# Patient Record
Sex: MALE | Race: WHITE | NOT HISPANIC OR LATINO | Employment: FULL TIME | URBAN - NONMETROPOLITAN AREA
[De-identification: names, ages, dates, MRNs, and addresses within clinical notes are randomized per-mention and may not be internally consistent; named-entity substitution may affect disease eponyms.]

---

## 2017-03-06 ENCOUNTER — OFFICE VISIT (OUTPATIENT)
Dept: FAMILY MEDICINE CLINIC | Facility: CLINIC | Age: 32
End: 2017-03-06

## 2017-03-06 VITALS
WEIGHT: 188.9 LBS | DIASTOLIC BLOOD PRESSURE: 78 MMHG | HEIGHT: 70 IN | BODY MASS INDEX: 27.04 KG/M2 | SYSTOLIC BLOOD PRESSURE: 110 MMHG

## 2017-03-06 DIAGNOSIS — Z72.0 TOBACCO ABUSE: Primary | ICD-10-CM

## 2017-03-06 PROCEDURE — 99213 OFFICE O/P EST LOW 20 MIN: CPT | Performed by: FAMILY MEDICINE

## 2017-03-06 RX ORDER — BUPROPION HYDROCHLORIDE 150 MG/1
150 TABLET ORAL DAILY
Qty: 60 TABLET | Refills: 2 | Status: SHIPPED | OUTPATIENT
Start: 2017-03-06 | End: 2017-08-25

## 2017-03-06 NOTE — PROGRESS NOTES
Subjective   Masood Hernández is a 31 y.o. male.     Nicotine Dependence   Presents for initial visit. Symptoms include cravings and decreased concentration. Symptoms are negative for fatigue, headache, insomnia, irritability and sore throat. Preferred tobacco types include cigarettes. Preferred cigarette types include filtered. Preferred strength is light. Preferred cigarettes are non-menthol and menthol. His urge triggers include company of smokers and stress. Risk factors do not include contact with substance, decrease in perceived risk, disruptive behavior, drinking alcohol, drinking coffee, driving, meal time or perceived media message about smoking.He smokes 1 pack of cigarettes per day. He started smoking when he was 15-17 years old. Past treatments include buproprion and nicotine lozenge. The treatment provided moderate relief. Compliance with prior treatments has been poor (Ran out of refills). Masood is ready to quit. Masood has tried to quit 1 time. There is no history of alcohol abuse and drug use.        Current Outpatient Prescriptions:   •  buPROPion XL (WELLBUTRIN XL) 150 MG 24 hr tablet, Take 1 tablet by mouth Daily. Take 1 tablet by mouth once a day for a week and then increase to twice a day, Disp: 60 tablet, Rfl: 2  •  nicotine (NICOTROL) 10 MG inhaler, Inhale 1 puff As Needed for smoking cessation., Disp: 1 inhaler, Rfl: 4    The following portions of the patient's history were reviewed and updated as appropriate: allergies, current medications, past family history, past medical history, past social history, past surgical history and problem list.    Review of Systems   Constitutional: Negative for activity change, appetite change, fatigue, irritability and unexpected weight change.   HENT: Negative for sore throat.    Respiratory: Negative for cough, chest tightness, shortness of breath and wheezing.    Cardiovascular: Negative for chest pain, palpitations and leg swelling.  "  Gastrointestinal: Negative for abdominal distention, abdominal pain, constipation, diarrhea, nausea and vomiting.   Skin: Negative for pallor, rash and wound.   Neurological: Negative for headaches.   Psychiatric/Behavioral: Positive for decreased concentration. Negative for dysphoric mood and sleep disturbance. The patient is not nervous/anxious and does not have insomnia.        Objective    Vitals:    03/06/17 0948   BP: 110/78   Weight: 188 lb 14.4 oz (85.7 kg)   Height: 70\" (177.8 cm)     Physical Exam   Constitutional: He is oriented to person, place, and time. He appears well-developed and well-nourished. No distress.   Cardiovascular: Normal rate, regular rhythm and normal heart sounds.    No murmur heard.  No LE edema.   Pulmonary/Chest: Effort normal and breath sounds normal. No respiratory distress.   Abdominal: Soft. Bowel sounds are normal. He exhibits no distension. There is no tenderness.   Neurological: He is alert and oriented to person, place, and time.   Psychiatric: He has a normal mood and affect. His behavior is normal. Judgment and thought content normal.   Nursing note and vitals reviewed.      Assessment/Plan   Problems Addressed this Visit        Other    Tobacco abuse - Primary    Relevant Medications    buPROPion XL (WELLBUTRIN XL) 150 MG 24 hr tablet    nicotine (NICOTROL) 10 MG inhaler        Did well on the wellbutrin before.  Will restart that medication. Has been living healthier lifestyle and hoping that will help keep him motivated this time.  Also will try nicotine inhaler PRN cravings.  RTC in 3 months or sooner PRN           "